# Patient Record
Sex: MALE | Race: WHITE | NOT HISPANIC OR LATINO | Employment: FULL TIME | URBAN - METROPOLITAN AREA
[De-identification: names, ages, dates, MRNs, and addresses within clinical notes are randomized per-mention and may not be internally consistent; named-entity substitution may affect disease eponyms.]

---

## 2019-06-15 ENCOUNTER — OFFICE VISIT (OUTPATIENT)
Dept: URGENT CARE | Facility: URGENT CARE | Age: 39
End: 2019-06-15
Payer: COMMERCIAL

## 2019-06-15 VITALS
BODY MASS INDEX: 36.03 KG/M2 | HEIGHT: 72 IN | SYSTOLIC BLOOD PRESSURE: 120 MMHG | HEART RATE: 103 BPM | DIASTOLIC BLOOD PRESSURE: 82 MMHG | WEIGHT: 266 LBS | TEMPERATURE: 98.2 F | OXYGEN SATURATION: 96 %

## 2019-06-15 DIAGNOSIS — R05.9 COUGH: ICD-10-CM

## 2019-06-15 DIAGNOSIS — R07.0 THROAT PAIN: ICD-10-CM

## 2019-06-15 DIAGNOSIS — J03.90 TONSILLITIS: Primary | ICD-10-CM

## 2019-06-15 LAB
DEPRECATED S PYO AG THROAT QL EIA: NORMAL
SPECIMEN SOURCE: NORMAL

## 2019-06-15 PROCEDURE — 99204 OFFICE O/P NEW MOD 45 MIN: CPT | Performed by: PHYSICIAN ASSISTANT

## 2019-06-15 PROCEDURE — 87880 STREP A ASSAY W/OPTIC: CPT | Performed by: PHYSICIAN ASSISTANT

## 2019-06-15 PROCEDURE — 87081 CULTURE SCREEN ONLY: CPT | Performed by: PHYSICIAN ASSISTANT

## 2019-06-15 RX ORDER — AMOXICILLIN 875 MG
875 TABLET ORAL 2 TIMES DAILY
Qty: 20 TABLET | Refills: 0 | Status: SHIPPED | OUTPATIENT
Start: 2019-06-15 | End: 2019-06-25

## 2019-06-15 RX ORDER — IBUPROFEN 200 MG
800 TABLET ORAL EVERY 4 HOURS PRN
COMMUNITY
End: 2022-01-06

## 2019-06-15 RX ORDER — BENZONATATE 200 MG/1
200 CAPSULE ORAL 3 TIMES DAILY PRN
Qty: 21 CAPSULE | Refills: 0 | Status: SHIPPED | OUTPATIENT
Start: 2019-06-15 | End: 2019-06-22

## 2019-06-15 RX ORDER — ACETAMINOPHEN 500 MG
1000 TABLET ORAL EVERY 6 HOURS PRN
COMMUNITY

## 2019-06-15 ASSESSMENT — ENCOUNTER SYMPTOMS
JOINT SWELLING: 0
DIARRHEA: 0
HEADACHES: 0
WHEEZING: 0
SHORTNESS OF BREATH: 0
NAUSEA: 0
CONFUSION: 0
BRUISES/BLEEDS EASILY: 0
VOMITING: 0
COUGH: 1
FEVER: 1
SORE THROAT: 1

## 2019-06-15 ASSESSMENT — PAIN SCALES - GENERAL: PAINLEVEL: SEVERE PAIN (7)

## 2019-06-15 ASSESSMENT — MIFFLIN-ST. JEOR: SCORE: 2169.57

## 2019-06-15 NOTE — PROGRESS NOTES
SUBJECTIVE:   Mayur Benavides is a 39 year old male presenting with a chief complaint of   Chief Complaint   Patient presents with     Urgent Care     Throat Pain     Sore throat, hurts to talk or swallow, white spots in throat, fever x 5d       He is a new patient of [facility].    URI    Onset of symptoms was 5 day(s) ago.  Course of illness is worsening.    Severity moderately severe  Current and Associated symptoms: sore throat, bilateral ear pain, productive cough, fever  Treatment measures tried include Tylenol/Ibuprofen, Chloraseptic spray, [facility]  Predisposing factors include : He is an , exposure to crowd        Review of Systems   Constitutional: Positive for fever.   HENT: Positive for sore throat.    Eyes: Negative for visual disturbance.   Respiratory: Positive for cough. Negative for shortness of breath and wheezing.    Gastrointestinal: Negative for diarrhea, nausea and vomiting.   Musculoskeletal: Negative for joint swelling.   Skin: Negative for rash.   Allergic/Immunologic: Negative for immunocompromised state.   Neurological: Negative for headaches.   Hematological: Does not bruise/bleed easily.   Psychiatric/Behavioral: Negative for confusion.       History reviewed. No pertinent past medical history.  History reviewed. No pertinent family history.  Current Outpatient Medications   Medication Sig Dispense Refill     acetaminophen (TYLENOL) 500 MG tablet Take 1,000 mg by mouth every 6 hours as needed for mild pain       amoxicillin (AMOXIL) 875 MG tablet Take 1 tablet (875 mg) by mouth 2 times daily for 10 days 20 tablet 0     benzonatate (TESSALON) 200 MG capsule Take 1 capsule (200 mg) by mouth 3 times daily as needed for cough 21 capsule 0     ibuprofen (ADVIL/MOTRIN) 200 MG tablet Take 800 mg by mouth every 4 hours as needed for mild pain       phenol (CHLORASEPTIC) 1.4 % spray Take 1 spray by mouth every hour as needed for sore throat       sodium-potassium  bicarbonate (SHAILA-SELTZER GOLD) TBEF solu-tab Take 1 tablet by mouth once       Social History     Tobacco Use     Smoking status: Never Smoker     Smokeless tobacco: Never Used   Substance Use Topics     Alcohol use: Not on file       OBJECTIVE  /82 (BP Location: Right arm, Patient Position: Sitting, Cuff Size: Adult Regular)   Pulse 103   Temp 98.2  F (36.8  C) (Tympanic)   Ht 1.829 m (6')   Wt 120.7 kg (266 lb)   SpO2 96%   BMI 36.08 kg/m      Physical Exam   Constitutional: He appears well-developed and well-nourished. No distress.   HENT:   Head: Normocephalic and atraumatic.   Right Ear: Tympanic membrane normal.   Left Ear: Tympanic membrane normal.   Mouth/Throat: Posterior oropharyngeal erythema present. Tonsils are 2+ on the right. Tonsils are 2+ on the left. No tonsillar exudate.   Eyes: Conjunctivae are normal.   Neck: Normal range of motion.   Cardiovascular: Regular rhythm and normal heart sounds.   Pulmonary/Chest: Effort normal and breath sounds normal. No respiratory distress.   Neurological: He is alert.   Skin: Skin is warm and dry.   Psychiatric: He has a normal mood and affect.   Nursing note and vitals reviewed.      Labs:  Results for orders placed or performed in visit on 06/15/19 (from the past 24 hour(s))   Rapid strep screen   Result Value Ref Range    Specimen Description Throat     Rapid Strep A Screen       NEGATIVE: No Group A streptococcal antigen detected by immunoassay, await culture report.           ASSESSMENT:      ICD-10-CM    1. Tonsillitis J03.90 amoxicillin (AMOXIL) 875 MG tablet   2. Cough R05 benzonatate (TESSALON) 200 MG capsule   3. Throat pain R07.0 Rapid strep screen     Beta strep group A culture          PLAN:    Tonsillitis: We have elected to treat based on length of symptoms and exam today.  Amoxicillin is prescribed today.  Tylenol or Motrin as needed for pain. We will communicate any positive findings on the throat culture result. Follow-up if any  worsening symptoms. He agrees.   Cough: Tessalon perles as needed for cough. Follow up if any worsening symptoms. He agrees.     Followup:    If not improving or if condition worsens, follow up with your Primary Care Provider

## 2019-06-16 LAB
BACTERIA SPEC CULT: NORMAL
SPECIMEN SOURCE: NORMAL

## 2019-11-09 ENCOUNTER — HEALTH MAINTENANCE LETTER (OUTPATIENT)
Age: 39
End: 2019-11-09

## 2020-12-06 ENCOUNTER — HEALTH MAINTENANCE LETTER (OUTPATIENT)
Age: 40
End: 2020-12-06

## 2021-09-26 ENCOUNTER — HEALTH MAINTENANCE LETTER (OUTPATIENT)
Age: 41
End: 2021-09-26

## 2021-12-19 ENCOUNTER — OFFICE VISIT (OUTPATIENT)
Dept: URGENT CARE | Facility: URGENT CARE | Age: 41
End: 2021-12-19
Payer: COMMERCIAL

## 2021-12-19 VITALS
WEIGHT: 283.6 LBS | DIASTOLIC BLOOD PRESSURE: 80 MMHG | SYSTOLIC BLOOD PRESSURE: 120 MMHG | BODY MASS INDEX: 38.46 KG/M2 | TEMPERATURE: 98.3 F | RESPIRATION RATE: 16 BRPM | HEART RATE: 73 BPM | OXYGEN SATURATION: 100 %

## 2021-12-19 DIAGNOSIS — R30.0 DYSURIA: ICD-10-CM

## 2021-12-19 DIAGNOSIS — N41.0 ACUTE PROSTATITIS: Primary | ICD-10-CM

## 2021-12-19 LAB
ALBUMIN UR-MCNC: NEGATIVE MG/DL
APPEARANCE UR: CLEAR
BILIRUB UR QL STRIP: NEGATIVE
COLOR UR AUTO: YELLOW
GLUCOSE UR STRIP-MCNC: NEGATIVE MG/DL
HGB UR QL STRIP: NEGATIVE
KETONES UR STRIP-MCNC: NEGATIVE MG/DL
LEUKOCYTE ESTERASE UR QL STRIP: NEGATIVE
NITRATE UR QL: NEGATIVE
PH UR STRIP: 6 [PH] (ref 5–7)
SP GR UR STRIP: >=1.03 (ref 1–1.03)
UROBILINOGEN UR STRIP-ACNC: 1 E.U./DL

## 2021-12-19 PROCEDURE — 99203 OFFICE O/P NEW LOW 30 MIN: CPT | Performed by: PHYSICIAN ASSISTANT

## 2021-12-19 PROCEDURE — 81003 URINALYSIS AUTO W/O SCOPE: CPT | Performed by: PHYSICIAN ASSISTANT

## 2021-12-19 NOTE — PATIENT INSTRUCTIONS
Patient Education     Bacterial Prostatitis  The prostate gland is part of the male reproductive system. The gland sits just below the bladder. It surrounds the urethra. This is the tube that carries urine and semen out of the body. Bacterial prostatitis is an infection of the prostate. It makes the prostate painful and swollen. The problem often occurs suddenly. It can make you very sick. But it can be treated.      With a healthy prostate, urine flows easily through the urethra.       With a swollen prostate, the urethra narrows. It s harder for urine to go through.      Causes and symptoms  Bacterial prostatitis is due to infection with germs (bacteria). This infection makes the prostate swell. This squeezes the urethra, narrowing or blocking it. Symptoms may be severe. They can include:     Fever and chills    Low back pain    Having to urinate often    Pain with urination    A less forceful urine stream    Needing to strain to urinate    Inability to urinate  Treatment  The infection is treated with antibiotics. Take all of the medicine until it's gone, even if you start to feel better. If you don t, the infection may come back. And it may be harder to treat. Your healthcare provider may also suggest other care. This may include resting and drinking more fluid. Follow any instructions you are given.   Chronic bacterial prostatitis  Prostatitis can turn into a long-term (chronic) problem. In this case, the prostate stays swollen and inflamed despite treatment with antibiotics. One possible cause is repeated infections. Symptoms include pain and burning with urination, and needing to urinate often. It may also cause lower belly or back pain. If you have this problem, your healthcare provider will talk with you about a treatment plan. Treatment is often taking antibiotics for 6 to 12 weeks.   [employer] last reviewed this educational content on 11/1/2019 2000-2021 [employer]. All rights reserved.  This information is not intended as a substitute for professional medical care. Always follow your healthcare professional's instructions.

## 2021-12-19 NOTE — PROGRESS NOTES
Dysuria  - UA Macro with Reflex to Micro and Culture - lab collect; Future  - UA Macro with Reflex to Micro and Culture - lab collect    Acute prostatitis  - amoxicillin-clavulanate (AUGMENTIN) 875-125 MG tablet; Take 1 tablet by mouth 2 times daily for 10 days     See Patient Instructions  Patient Instructions       Patient Education     Bacterial Prostatitis  The prostate gland is part of the male reproductive system. The gland sits just below the bladder. It surrounds the urethra. This is the tube that carries urine and semen out of the body. Bacterial prostatitis is an infection of the prostate. It makes the prostate painful and swollen. The problem often occurs suddenly. It can make you very sick. But it can be treated.      With a healthy prostate, urine flows easily through the urethra.       With a swollen prostate, the urethra narrows. It s harder for urine to go through.      Causes and symptoms  Bacterial prostatitis is due to infection with germs (bacteria). This infection makes the prostate swell. This squeezes the urethra, narrowing or blocking it. Symptoms may be severe. They can include:     Fever and chills    Low back pain    Having to urinate often    Pain with urination    A less forceful urine stream    Needing to strain to urinate    Inability to urinate  Treatment  The infection is treated with antibiotics. Take all of the medicine until it's gone, even if you start to feel better. If you don t, the infection may come back. And it may be harder to treat. Your healthcare provider may also suggest other care. This may include resting and drinking more fluid. Follow any instructions you are given.   Chronic bacterial prostatitis  Prostatitis can turn into a long-term (chronic) problem. In this case, the prostate stays swollen and inflamed despite treatment with antibiotics. One possible cause is repeated infections. Symptoms include pain and burning with urination, and needing to urinate often.  It may also cause lower belly or back pain. If you have this problem, your healthcare provider will talk with you about a treatment plan. Treatment is often taking antibiotics for 6 to 12 weeks.   [employer] last reviewed this educational content on 11/1/2019 2000-2021 [employer]. All rights reserved. This information is not intended as a substitute for professional medical care. Always follow your healthcare professional's instructions.               Remy Dempsey PA-C  [facility]    Subjective   41 year old who presents to clinic today for the following health issues:    UTI       HPI     Genitourinary - Male  Onset/Duration: 1 week  Description:   Dysuria (painful urination): no}  Hematuria (blood in urine): no  Frequency: YES  Waking at night to urinate: no  Hesitancy (delay in urine): no  Retention (unable to empty): no  Decrease in urinary flow: no  Incontinence: no  Progression of Symptoms:  worsening  Accompanying Signs & Symptoms:  Fever: no  Back/Flank pain: no  Urethral discharge: no  Testicle lumps/masses/pain: no  Nausea and/or vomiting: no  Abdominal pain: YES- Lower abdomen   History:   History of frequent UTI s: no but patient has a history of prostatitis   History of kidney stones: no  History of hernias: no  Personal or Family history of Prostate problems: YES  Sexually active: no  Precipitating or alleviating factors: None  Therapies tried and outcome: none      Review of Systems   Review of Systems   See HPI     Objective    Temp: 98.3  F (36.8  C) Temp src: Oral BP: 120/80 Pulse: 73   Resp: 16 SpO2: 100 %       Physical Exam   Physical Exam  Constitutional:       General: He is not in acute distress.     Appearance: Normal appearance. He is normal weight. He is not ill-appearing, toxic-appearing or diaphoretic.   HENT:      Head: Normocephalic and atraumatic.   Cardiovascular:      Rate and Rhythm: Normal rate.      Pulses: Normal pulses.   Pulmonary:       Effort: Pulmonary effort is normal. No respiratory distress.   Neurological:      General: No focal deficit present.      Mental Status: He is alert and oriented to person, place, and time. Mental status is at baseline.      Gait: Gait normal.   Psychiatric:         Mood and Affect: Mood normal.         Behavior: Behavior normal.         Thought Content: Thought content normal.         Judgment: Judgment normal.          Results for orders placed or performed in visit on 12/19/21 (from the past 24 hour(s))   UA Macro with Reflex to Micro and Culture - lab collect    Specimen: Urine, Clean Catch   Result Value Ref Range    Color Urine Yellow Colorless, Straw, Light Yellow, Yellow    Appearance Urine Clear Clear    Glucose Urine Negative Negative mg/dL    Bilirubin Urine Negative Negative    Ketones Urine Negative Negative mg/dL    Specific Gravity Urine >=1.030 1.003 - 1.035    Blood Urine Negative Negative    pH Urine 6.0 5.0 - 7.0    Protein Albumin Urine Negative Negative mg/dL    Urobilinogen Urine 1.0 0.2, 1.0 E.U./dL    Nitrite Urine Negative Negative    Leukocyte Esterase Urine Negative Negative    Narrative    Microscopic not indicated

## 2022-01-06 ENCOUNTER — VIRTUAL VISIT (OUTPATIENT)
Dept: FAMILY MEDICINE | Facility: CLINIC | Age: 42
End: 2022-01-06
Payer: COMMERCIAL

## 2022-01-06 DIAGNOSIS — K92.1 BLOODY STOOLS: Primary | ICD-10-CM

## 2022-01-06 DIAGNOSIS — N41.0 ACUTE PROSTATITIS: ICD-10-CM

## 2022-01-06 PROCEDURE — 99213 OFFICE O/P EST LOW 20 MIN: CPT | Mod: GT | Performed by: NURSE PRACTITIONER

## 2022-01-06 RX ORDER — SULFAMETHOXAZOLE/TRIMETHOPRIM 800-160 MG
1 TABLET ORAL 2 TIMES DAILY
Qty: 20 TABLET | Refills: 0 | Status: SHIPPED | OUTPATIENT
Start: 2022-01-06 | End: 2022-01-16

## 2022-01-06 NOTE — PROGRESS NOTES
Mayur is a 41 year old who is being evaluated via a billable video visit.      How would you like to obtain your AVS? MyChart  If the video visit is dropped, the invitation should be resent by: Text to cell phone: [phone]  Will anyone else be joining your video visit? No      Video Start Time: 11:27 AM    Assessment & Plan     Bloody stools  Has seen GI and has upcoming colonoscopy to further evaluate this. Needs face to face if bloody stools recur prior to colonoscopy.    Acute prostatitis  Start 10 day course of Bactrim, follow up if not improving over the next 48 hours.  - sulfamethoxazole-trimethoprim (BACTRIM DS) 800-160 MG tablet; Take 1 tablet by mouth 2 times daily for 10 days    Review of prior external note(s) from - Outside records from Urgent Care  Prescription drug management         See Patient Instructions    Return in about 2 days (around 1/8/2022) for if not improving.    ZARA Zamora CNP  [facility]   Mayur is a 41 year old who presents for the following health issues     HPI     ED/UC Followup:    Facility:  [facility] [place]  Date of visit: 12/19/2021  Reason for visit: Acute prostatitis, Dysuria  Current Status: Bloody stools from Amoxicillin, so he stopped taking and now has abdominal pain and urine frequency     Patient presents for bloody stools and urinary frequency.Has had 2-3 episodes of bloody stools, associated with taking Amoxicillin, over the past 3 months. Stools looked like clotted bowl in toilet. Had associated constipation. Upon further history, patient has seen GI for this and has colonoscopy scheduled for 1/27/22.    Was treated for prostatitis 3 weeks ago with Augmentin. Stopped antibiotics after about 5 days due to bloody stools. Symptoms have returned recently and having frequency, urgency. Has prostatitis before and required 2 courses of antibiotics to clear.      Review of Systems   Constitutional,  HEENT, cardiovascular, pulmonary, gi and gu systems are negative, except as otherwise noted.      Objective           Vitals:  No vitals were obtained today due to virtual visit.    Physical Exam   GENERAL: Healthy, alert and no distress  EYES: Eyes grossly normal to inspection.  No discharge or erythema, or obvious scleral/conjunctival abnormalities.  RESP: No audible wheeze, cough, or visible cyanosis.  No visible retractions or increased work of breathing.    SKIN: Visible skin clear. No significant rash, abnormal pigmentation or lesions.  NEURO: Cranial nerves grossly intact.  Mentation and speech appropriate for age.  PSYCH: Mentation appears normal, affect normal/bright, judgement and insight intact, normal speech and appearance well-groomed.    No results found for any visits on 01/06/22.            Video-Visit Details    Type of service:  Video Visit    Video End Time:11:40 AM    Originating Location (pt. Location): Other hotel room    Distant Location (provider location):  [facility]     Platform used for Video Visit: [employer]

## 2022-01-15 ENCOUNTER — HEALTH MAINTENANCE LETTER (OUTPATIENT)
Age: 42
End: 2022-01-15

## 2023-04-23 ENCOUNTER — HEALTH MAINTENANCE LETTER (OUTPATIENT)
Age: 43
End: 2023-04-23

## 2023-09-27 ENCOUNTER — HOSPITAL ENCOUNTER (EMERGENCY)
Facility: CLINIC | Age: 43
Discharge: HOME | End: 2023-09-27
Attending: EMERGENCY MEDICINE | Admitting: EMERGENCY MEDICINE
Payer: COMMERCIAL

## 2023-09-27 ENCOUNTER — APPOINTMENT (OUTPATIENT)
Dept: CT IMAGING | Facility: CLINIC | Age: 43
End: 2023-09-27
Attending: EMERGENCY MEDICINE
Payer: COMMERCIAL

## 2023-09-27 VITALS
DIASTOLIC BLOOD PRESSURE: 99 MMHG | OXYGEN SATURATION: 100 % | SYSTOLIC BLOOD PRESSURE: 147 MMHG | HEART RATE: 100 BPM | TEMPERATURE: 99.1 F | RESPIRATION RATE: 16 BRPM

## 2023-09-27 DIAGNOSIS — K57.92 DIVERTICULITIS: ICD-10-CM

## 2023-09-27 DIAGNOSIS — R10.31 RIGHT LOWER QUADRANT ABDOMINAL PAIN: ICD-10-CM

## 2023-09-27 LAB
ANION GAP SERPL CALCULATED.3IONS-SCNC: 8 MMOL/L (ref 7–15)
BASOPHILS # BLD AUTO: 0 10E3/UL (ref 0–0.2)
BASOPHILS NFR BLD AUTO: 0 %
BUN SERPL-MCNC: 18.8 MG/DL (ref 6–20)
CALCIUM SERPL-MCNC: 9.2 MG/DL (ref 8.6–10)
CHLORIDE SERPL-SCNC: 104 MMOL/L (ref 98–107)
CREAT SERPL-MCNC: 1.19 MG/DL (ref 0.67–1.17)
DEPRECATED HCO3 PLAS-SCNC: 29 MMOL/L (ref 22–29)
EGFRCR SERPLBLD CKD-EPI 2021: 79 ML/MIN/1.73M2
EOSINOPHIL # BLD AUTO: 0.2 10E3/UL (ref 0–0.7)
EOSINOPHIL NFR BLD AUTO: 2 %
ERYTHROCYTE [DISTWIDTH] IN BLOOD BY AUTOMATED COUNT: 12.1 % (ref 10–15)
GLUCOSE SERPL-MCNC: 144 MG/DL (ref 70–99)
HCT VFR BLD AUTO: 43.1 % (ref 40–53)
HGB BLD-MCNC: 14.3 G/DL (ref 13.3–17.7)
HOLD SPECIMEN: NORMAL
HOLD SPECIMEN: NORMAL
IMM GRANULOCYTES # BLD: 0 10E3/UL
IMM GRANULOCYTES NFR BLD: 0 %
LYMPHOCYTES # BLD AUTO: 2.3 10E3/UL (ref 0.8–5.3)
LYMPHOCYTES NFR BLD AUTO: 24 %
MCH RBC QN AUTO: 30.4 PG (ref 26.5–33)
MCHC RBC AUTO-ENTMCNC: 33.2 G/DL (ref 31.5–36.5)
MCV RBC AUTO: 92 FL (ref 78–100)
MONOCYTES # BLD AUTO: 0.7 10E3/UL (ref 0–1.3)
MONOCYTES NFR BLD AUTO: 7 %
NEUTROPHILS # BLD AUTO: 6.4 10E3/UL (ref 1.6–8.3)
NEUTROPHILS NFR BLD AUTO: 67 %
NRBC # BLD AUTO: 0 10E3/UL
NRBC BLD AUTO-RTO: 0 /100
PLATELET # BLD AUTO: 194 10E3/UL (ref 150–450)
POTASSIUM SERPL-SCNC: 4.1 MMOL/L (ref 3.4–5.3)
RBC # BLD AUTO: 4.7 10E6/UL (ref 4.4–5.9)
SODIUM SERPL-SCNC: 141 MMOL/L (ref 135–145)
WBC # BLD AUTO: 9.7 10E3/UL (ref 4–11)

## 2023-09-27 PROCEDURE — 85025 COMPLETE CBC W/AUTO DIFF WBC: CPT | Performed by: EMERGENCY MEDICINE

## 2023-09-27 PROCEDURE — 250N000011 HC RX IP 250 OP 636: Mod: JZ | Performed by: EMERGENCY MEDICINE

## 2023-09-27 PROCEDURE — 80048 BASIC METABOLIC PNL TOTAL CA: CPT | Performed by: EMERGENCY MEDICINE

## 2023-09-27 PROCEDURE — 99285 EMERGENCY DEPT VISIT HI MDM: CPT | Mod: 25

## 2023-09-27 PROCEDURE — 96374 THER/PROPH/DIAG INJ IV PUSH: CPT | Mod: 59

## 2023-09-27 PROCEDURE — 74177 CT ABD & PELVIS W/CONTRAST: CPT

## 2023-09-27 PROCEDURE — 36415 COLL VENOUS BLD VENIPUNCTURE: CPT | Performed by: EMERGENCY MEDICINE

## 2023-09-27 PROCEDURE — 250N000013 HC RX MED GY IP 250 OP 250 PS 637: Performed by: EMERGENCY MEDICINE

## 2023-09-27 PROCEDURE — 250N000011 HC RX IP 250 OP 636: Performed by: EMERGENCY MEDICINE

## 2023-09-27 RX ORDER — IOPAMIDOL 755 MG/ML
500 INJECTION, SOLUTION INTRAVASCULAR ONCE
Status: COMPLETED | OUTPATIENT
Start: 2023-09-27 | End: 2023-09-27

## 2023-09-27 RX ORDER — OXYCODONE HYDROCHLORIDE 5 MG/1
5 TABLET ORAL ONCE
Status: COMPLETED | OUTPATIENT
Start: 2023-09-27 | End: 2023-09-27

## 2023-09-27 RX ORDER — CIPROFLOXACIN 500 MG/1
500 TABLET, FILM COATED ORAL 2 TIMES DAILY
Qty: 14 TABLET | Refills: 0 | Status: SHIPPED | OUTPATIENT
Start: 2023-09-27 | End: 2023-10-04

## 2023-09-27 RX ORDER — ONDANSETRON 4 MG/1
4 TABLET, ORALLY DISINTEGRATING ORAL EVERY 8 HOURS PRN
Qty: 10 TABLET | Refills: 0 | Status: SHIPPED | OUTPATIENT
Start: 2023-09-27 | End: 2023-09-30

## 2023-09-27 RX ORDER — METRONIDAZOLE 500 MG/1
500 TABLET ORAL 3 TIMES DAILY
Qty: 21 TABLET | Refills: 0 | Status: SHIPPED | OUTPATIENT
Start: 2023-09-27 | End: 2023-10-04

## 2023-09-27 RX ORDER — KETOROLAC TROMETHAMINE 15 MG/ML
15 INJECTION, SOLUTION INTRAMUSCULAR; INTRAVENOUS ONCE
Status: COMPLETED | OUTPATIENT
Start: 2023-09-27 | End: 2023-09-27

## 2023-09-27 RX ADMIN — IOPAMIDOL 100 ML: 755 INJECTION, SOLUTION INTRAVENOUS at 21:27

## 2023-09-27 RX ADMIN — OXYCODONE HYDROCHLORIDE 5 MG: 5 TABLET ORAL at 23:00

## 2023-09-27 RX ADMIN — KETOROLAC TROMETHAMINE 15 MG: 15 INJECTION, SOLUTION INTRAMUSCULAR; INTRAVENOUS at 20:49

## 2023-09-27 ASSESSMENT — ACTIVITIES OF DAILY LIVING (ADL)
ADLS_ACUITY_SCORE: 35
ADLS_ACUITY_SCORE: 35

## 2023-09-28 NOTE — ED NOTES
PIT/Triage Evaluation    Patient presented with abdominal pain. He reports lower abdominal pain that radiates to his RLQ. Pain began this morning. Denies fever, nausea, vomiting, or diarrhea.     Exam is notable for:  General: No distress  Abdomen: Soft, right lower quadrant tenderness to palpation  Respiratory: No tachypnea  Cardiovascular: Equal pulses, brisk cap refill  Extremities: Moving all extremities    Appropriate interventions for symptom management were initiated if applicable.  Appropriate diagnostic tests were initiated if indicated.    Important information for subsequent clinician:  Right lower quadrant pain.  No previous abdominal surgeries.  Labs and CT ordered.    I briefly evaluated the patient and developed an initial plan of care. I discussed this plan and explained that this brief interaction does not constitute a full evaluation. Patient/family understands that they should wait to be fully evaluated and discuss any test results with another clinician prior to leaving the hospital.       Susan Shine MD  09/27/23 [id number]

## 2023-09-28 NOTE — DISCHARGE INSTRUCTIONS
Please return to the emergency department if your symptoms increase, you experience uncontrollable nausea or vomiting, or an increase in your pain.    Please take the antibiotics for the next 7 days.  You can use Zofran for nausea and vomiting.  Please drink plenty of fluids.  Please eat a bland diet.    Please follow-up with your primary care provider in the next week.

## 2023-09-28 NOTE — ED PROVIDER NOTES
History     Chief Complaint:  Abdominal Pain       The history is provided by the patient.      Mayur Benavides is a 43 year old male who presents to the ED with his wife for evaluation of abdominal pain. He reports lower abdominal pain that radiates to his RLQ. Pain began this morning. Denies fever, nausea, vomiting, or diarrhea.      Independent Historian:   None - Patient Only    Review of External Notes:   None     Medications:    The patient is not currently taking any prescribed medications.    Past Medical History:    No pertinent past medical history    Past Surgical History:    Knee Surgery - left     Physical Exam   Patient Vitals for the past 24 hrs:   BP Temp Temp src Pulse Resp SpO2   09/27/23 2036 (!) 147/99 -- -- -- -- --   09/27/23 2035 -- 99.1  F (37.3  C) Oral 100 16 100 %      Physical Exam  General: Well-nourished, resting comfortably when I enter the room  Eyes: Pupils equal, conjunctivae pink no scleral icterus or conjunctival injection  ENT:  Moist mucus membranes  Respiratory:  Lungs clear to auscultation bilaterally, no crackles/rubs/wheezes.  Good air movement  CV: Normal rate and rhythm, no murmurs  GI:  Abdomen soft and non-distended.  No guarding or rebound.  Tenderness to palpation in the right lower quadrant.  Skin: Warm, dry.  No rashes or petechiae  Musculoskeletal: No peripheral edema or calf tenderness  Neuro: Alert and oriented to person/place/time  Psychiatric: Normal affect    Emergency Department Course     Imaging:  CT Abdomen Pelvis w Contrast   Final Result   IMPRESSION:    1.  Mild acute uncomplicated sigmoid diverticulitis.         Report per radiology    Laboratory:  Labs Ordered and Resulted from Time of ED Arrival to Time of ED Departure   BASIC METABOLIC PANEL - Abnormal       Result Value    Sodium 141      Potassium 4.1      Chloride 104      Carbon Dioxide (CO2) 29      Anion Gap 8      Urea Nitrogen 18.8      Creatinine 1.19 (*)     GFR Estimate 79      Calcium  9.2      Glucose 144 (*)    CBC WITH PLATELETS AND DIFFERENTIAL    WBC Count 9.7      RBC Count 4.70      Hemoglobin 14.3      Hematocrit 43.1      MCV 92      MCH 30.4      MCHC 33.2      RDW 12.1      Platelet Count 194      % Neutrophils 67      % Lymphocytes 24      % Monocytes 7      % Eosinophils 2      % Basophils 0      % Immature Granulocytes 0      NRBCs per 100 WBC 0      Absolute Neutrophils 6.4      Absolute Lymphocytes 2.3      Absolute Monocytes 0.7      Absolute Eosinophils 0.2      Absolute Basophils 0.0      Absolute Immature Granulocytes 0.0      Absolute NRBCs 0.0        Procedures   None    Emergency Department Course & Assessments:       Interventions:  Medications   ketorolac (TORADOL) injection 15 mg (15 mg Intravenous $Given 23)   sodium chloride (PF) 0.9% PF flush 100 mL (65 mLs Intravenous $Given 23)   iopamidol (ISOVUE-370) solution 500 mL (100 mLs Intravenous $Given 23)   oxyCODONE (ROXICODONE) tablet 5 mg (5 mg Oral $Given 23)      Independent Interpretation (X-rays, CTs, rhythm strip):  None    Assessments/Consultations/Discussion of Management or Tests:   ED Course as of 09/27/23 2303   Wed Sep 27, 2023   2040 I briefly obtained history and examined the patient in triage.     I rechecked the patient and explained findings. I prepared the patient to be discharged home.       Social Determinants of Health affecting care:   None    Disposition:  The patient was discharged to home.     Impression & Plan      Medical Decision Makin-year-old male presents emergency department with a complaint of right lower quadrant pain.  Did start this morning.  Denies fever, nausea, vomiting, diarrhea.  Evaluation patient does have tenderness to palpation in the right lower quadrant.  Differential includes but is not limited to appendicitis, diverticulitis, kidney stone.  CT scan shows uncomplicated mild diverticulitis.  On review of the CT scan by  myself, it looks like it is localized on the right side, which correlates with his pain.  No evidence of abscess or perforation.  Patient is given a dose of pain medicine here in the emergency department.  He is also given a prescription for Cipro and Flagyl.  Patient is given strict return precautions.  He is discharged home.    Diagnosis:    ICD-10-CM    1. Diverticulitis  K57.92       2. Right lower quadrant abdominal pain  R10.31            Discharge Medications:  New Prescriptions    CIPROFLOXACIN (CIPRO) 500 MG TABLET    Take 1 tablet (500 mg) by mouth 2 times daily for 7 days    METRONIDAZOLE (FLAGYL) 500 MG TABLET    Take 1 tablet (500 mg) by mouth 3 times daily for 7 days    ONDANSETRON (ZOFRAN ODT) 4 MG ODT TAB    Take 1 tablet (4 mg) by mouth every 8 hours as needed for nausea      Scribe Disclosure:  I, Suzy Jackson, am serving as a scribe at 10:45 PM on 9/27/2023 to document services personally performed by Susan Shine MD based on my observations and the provider's statements to me.   9/27/2023   Susan Shine MD Richardson, Elizabeth, MD  09/28/23 0043

## 2024-06-29 ENCOUNTER — HEALTH MAINTENANCE LETTER (OUTPATIENT)
Age: 44
End: 2024-06-29

## 2025-07-13 ENCOUNTER — HEALTH MAINTENANCE LETTER (OUTPATIENT)
Age: 45
End: 2025-07-13